# Patient Record
Sex: FEMALE | Race: WHITE | NOT HISPANIC OR LATINO | URBAN - METROPOLITAN AREA
[De-identification: names, ages, dates, MRNs, and addresses within clinical notes are randomized per-mention and may not be internally consistent; named-entity substitution may affect disease eponyms.]

---

## 2017-04-13 ENCOUNTER — OFFICE VISIT - RIVER FALLS (OUTPATIENT)
Dept: FAMILY MEDICINE | Facility: CLINIC | Age: 22
End: 2017-04-13

## 2017-04-13 ASSESSMENT — MIFFLIN-ST. JEOR: SCORE: 1908.68

## 2018-08-31 ENCOUNTER — OFFICE VISIT - RIVER FALLS (OUTPATIENT)
Dept: FAMILY MEDICINE | Facility: CLINIC | Age: 23
End: 2018-08-31

## 2018-09-28 ENCOUNTER — OFFICE VISIT - RIVER FALLS (OUTPATIENT)
Dept: FAMILY MEDICINE | Facility: CLINIC | Age: 23
End: 2018-09-28

## 2018-09-28 ASSESSMENT — MIFFLIN-ST. JEOR: SCORE: 1745.39

## 2018-12-04 ENCOUNTER — OFFICE VISIT - RIVER FALLS (OUTPATIENT)
Dept: FAMILY MEDICINE | Facility: CLINIC | Age: 23
End: 2018-12-04

## 2018-12-04 ASSESSMENT — MIFFLIN-ST. JEOR: SCORE: 1747.2

## 2018-12-05 ENCOUNTER — OFFICE VISIT - RIVER FALLS (OUTPATIENT)
Dept: FAMILY MEDICINE | Facility: CLINIC | Age: 23
End: 2018-12-05

## 2019-01-31 ENCOUNTER — OFFICE VISIT - RIVER FALLS (OUTPATIENT)
Dept: FAMILY MEDICINE | Facility: CLINIC | Age: 24
End: 2019-01-31

## 2019-01-31 ASSESSMENT — MIFFLIN-ST. JEOR: SCORE: 1742.66

## 2022-02-11 VITALS
WEIGHT: 243 LBS | HEIGHT: 69 IN | DIASTOLIC BLOOD PRESSURE: 70 MMHG | SYSTOLIC BLOOD PRESSURE: 118 MMHG | HEART RATE: 64 BPM | TEMPERATURE: 98.1 F | BODY MASS INDEX: 35.99 KG/M2

## 2022-02-12 VITALS
DIASTOLIC BLOOD PRESSURE: 72 MMHG | WEIGHT: 207 LBS | TEMPERATURE: 97.5 F | HEIGHT: 69 IN | WEIGHT: 204 LBS | HEART RATE: 70 BPM | HEART RATE: 70 BPM | DIASTOLIC BLOOD PRESSURE: 64 MMHG | SYSTOLIC BLOOD PRESSURE: 114 MMHG | BODY MASS INDEX: 30.57 KG/M2 | SYSTOLIC BLOOD PRESSURE: 118 MMHG | BODY MASS INDEX: 30.66 KG/M2 | TEMPERATURE: 98 F

## 2022-02-12 VITALS
HEART RATE: 56 BPM | DIASTOLIC BLOOD PRESSURE: 74 MMHG | HEART RATE: 48 BPM | WEIGHT: 206.4 LBS | SYSTOLIC BLOOD PRESSURE: 102 MMHG | DIASTOLIC BLOOD PRESSURE: 70 MMHG | HEART RATE: 52 BPM | TEMPERATURE: 97.1 F | WEIGHT: 207.4 LBS | TEMPERATURE: 98.6 F | DIASTOLIC BLOOD PRESSURE: 70 MMHG | SYSTOLIC BLOOD PRESSURE: 106 MMHG | HEIGHT: 69 IN | BODY MASS INDEX: 30.57 KG/M2 | SYSTOLIC BLOOD PRESSURE: 122 MMHG | OXYGEN SATURATION: 99 % | HEIGHT: 69 IN | WEIGHT: 204 LBS | BODY MASS INDEX: 30.72 KG/M2 | TEMPERATURE: 98.4 F | BODY MASS INDEX: 30.57 KG/M2

## 2022-02-16 NOTE — NURSING NOTE
Comprehensive Intake Entered On:  1/31/2019 6:31 PM CST    Performed On:  1/31/2019 6:27 PM CST by Yasmine Pierce               Summary   Chief Complaint :   Duloxetine med check/ refill   Menstrual Status :   Menarcheal   Weight Measured :   206.4 lb(Converted to: 206 lb 6 oz, 93.62 kg)    Height Measured :   68.5 in(Converted to: 5 ft 8 in, 173.99 cm)    Body Mass Index :   30.92 kg/m2 (HI)    Body Surface Area :   2.12 m2   Systolic Blood Pressure :   102 mmHg   Diastolic Blood Pressure :   74 mmHg   Mean Arterial Pressure :   83 mmHg   Peripheral Pulse Rate :   56 bpm (LOW)    BP Site :   Right arm   Pulse Site :   Radial artery   BP Method :   Manual   HR Method :   Manual   Temperature Tympanic :   98.6 DegF(Converted to: 37.0 DegC)    Yasmine Pierce - 1/31/2019 6:27 PM CST   Health Status   Allergies Verified? :   Yes   Medication History Verified? :   Yes   Medical History Verified? :   Yes   Pre-Visit Planning Status :   Completed   Tobacco Use? :   Never smoker   Yasmine Pierce - 1/31/2019 6:27 PM CST   Consents   Consent for Immunization Exchange :   Consent Granted   Consent for Immunizations to Providers :   Consent Granted   Yasmine Pierce - 1/31/2019 6:27 PM CST   Meds / Allergies   (As Of: 1/31/2019 6:31:34 PM CST)   Allergies (Active)   No Known Medication Allergies  Estimated Onset Date:   Unspecified ; Created By:   Delores West CMA; Reaction Status:   Active ; Category:   Drug ; Substance:   No Known Medication Allergies ; Type:   Allergy ; Updated By:   Delores West CMA; Reviewed Date:   1/31/2019 6:31 PM CST        Medication List   (As Of: 1/31/2019 6:31:34 PM CST)   Prescription/Discharge Order    DULoxetine  :   DULoxetine ; Status:   Prescribed ; Ordered As Mnemonic:   DULoxetine 20 mg oral delayed release capsule ; Simple Display Line:   2 cap(s), Oral, bid, for 14 day(s), 56 cap(s), 0 Refill(s) ; Ordering Provider:   Jeanie Sanches; Catalog Code:   DULoxetine ; Order  Dt/Tm:   1/16/2019 2:46:26 PM            Home Meds    levonorgestrel  :   levonorgestrel ; Status:   Documented ; Ordered As Mnemonic:   Mary 13.5 mg intrauterine device ; Simple Display Line:   13.5 mg, 1 EA, Intrauteral, once, 0 Refill(s) ; Catalog Code:   levonorgestrel ; Order Dt/Tm:   9/23/2018 4:00:53 PM
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
## 2022-02-16 NOTE — NURSING NOTE
Generalized Anxiety Disorder Screening Entered On:  2/4/2019 2:03 PM CST    Performed On:  1/31/2019 2:02 PM CST by Lottie Yepez               Generalized Anxiety Disorder Screening   CHAPIN[place]O Nervous, Anxious On Edge :   More than half the days   [place]APINCITO Control Worrying B :   More than half the days   [place]APINCITO Worrying Too Much :   More than half the days   CHAPINCITO Restless :   Not at all   [place]APINCITO Easily Annoyed/Irritable :   Several days   CHAPINCITO Afraid :   Several days   [place]INCITO Trouble Relaxing :   Several days   CHAPINCITO Total Screening Score :   9    [place]IN[place]O Difficulty with Work, Home, Others :   Somewhat difficult   Lottie Yepez - 2/4/2019 2:02 PM CST   denies/3-4 cups/cans per day

## 2022-02-16 NOTE — TELEPHONE ENCOUNTER
---------------------  From: Lottie Yepez   To: Jeanie Sanches;     Sent: 4/16/2019 10:01:14 AM CDT  Subject: Scheduling Management     Patient no showed appointment. Appointment was for a post hospital visit-[facility] Scheduled the appointment.I left Dania a message to call me and let me know how she is doing and/or schedule to see me

## 2022-02-16 NOTE — TELEPHONE ENCOUNTER
---------------------  From: Lottie Yepez   To: Baron MARTI, Jeanie;     Sent: 1/31/2019 3:49:18 PM CST  Subject: Scheduling Management     Patient now showed 240pm appointment. Appointment was for a medication follow up.I called her, she forgot her appt, I will see her at 6:20 today

## 2022-02-16 NOTE — NURSING NOTE
Depression Screening Entered On:  2/4/2019 2:02 PM CST    Performed On:  1/31/2019 2:02 PM CST by Lottie Yepez               Depression Screening   Feeling Down, Depressed, Hopeless :   More than half the days   Little Interest - Pleasure in Activities :   Several days   Initial Depression Screen Score :   3    Trouble Falling or Staying Asleep :   Nearly every day   Feeling Tired or Little Energy :   More than half the days   Poor Appetite or Overeating :   Several days   Feeling Bad About Yourself :   More than half the days   Trouble Concentrating :   More than half the days   Moving or Speaking Slowly :   Several days   Thoughts Better Off Dead or Hurting Self :   Not at all   Detailed Depression Screen Score :   11    Total Depression Screen Score :   14    CHAPINCITO Difficulty with Work, Home, Others :   Somewhat difficult   Lottie Yepez - 2/4/2019 2:02 PM CST

## 2022-02-16 NOTE — NURSING NOTE
CAGE Assessment Entered On:  2/4/2019 2:02 PM CST    Performed On:  1/31/2019 2:02 PM CST by Lottie Yepez               Assessment   Have you ever felt you should cut down on your drinking :   No   Have people annoyed you by criticizing your drinking :   No   Have you ever felt bad or guilty about your drinking :   No   Have you ever taken a drink first thing in the morning to steady your nerves or get rid of a hangover (Eye-opener) :   No   CAGE Score :   0    Lottie Yepez - 2/4/2019 2:02 PM CST
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
## 2022-02-16 NOTE — TELEPHONE ENCOUNTER
Entered by Keesha Carmona CMA on January 17, 2019 9:40:11 AM CST  ---------------------  From: Keesha Carmona CMA   To: [facility]    Sent: 1/17/2019 9:40:11 AM CST  Subject: Medication Management     ** Not Approved: Refill not appropriate, will need appt for further refills **  DULoxetine (DULOXETINE DR 20MG CAPSULES)  TAKE 2 CAPSULES BY MOUTH TWICE DAILY FOR 14 DAYS  Qty:  360 cap(s)        Days Supply:  14        Refills:  0          MICHEAL     Route To Pharmacy - [facility]   Note from Pharmacy:  **Patient requests 90 days supply**  Signed by Keesha Carmona CMA            ------------------------------------------  From: [facility]  To: Jeanie Sanches  Sent: January 16, 2019 2:52:08 PM CST  Subject: Medication Management  Due: January 17, 2019 2:52:08 PM CST    ** On Hold Pending Signature **  Drug: DULoxetine (DULoxetine 20 mg oral delayed release capsule)  2 CAP(S) ORAL BID,X14 DAY(S)  Quantity: 56 cap(s)     Days Supply: 0         Refills: 0  Substitutions Allowed  Notes from Pharmacy: **Patient requests 90 days supply**    Dispensed Drug: DULoxetine (DULoxetine 20 mg oral delayed release capsule)  TAKE 2 CAPSULES BY MOUTH TWICE DAILY FOR 14 DAYS  Quantity: 360 cap(s)    Days Supply: 14        Refills: 0  Substitutions Allowed  Notes from Pharmacy: **Patient requests 90 days supply**  ------------------------------------------
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
## 2022-02-16 NOTE — TELEPHONE ENCOUNTER
---------------------From: Navya Merino (Phone Messages Pool ([id number]_[employer])) To: Jeanie Sanches;   Sent: 1/15/2019 6:38:21 PM CSTSubject: Medication issue  Phone MessagePCP NCBTime of Call  5:48Person Calling PtPhone number [phone]  _ Pt left message stating that she called [facility] 1/14/2019 to get her medication refilled ( duloxetine 20 mg), but pharmacy told her that there was a hold on it. She is out of medication and would like us to call [facility].  Called to pharmacy, they state that her prescription was closed by the provider on 12/5/18.  They will need a new prescription sent over if pt is to be on it.  Please send in new rx or advise pt.Last office visit and reason _12/5/2018 Malaise---------------------From: Jeanie Sanches To: Phone [place] Pool ([id number]);   Sent: 1/16/2019 8:11:29 AM CSTSubject: RE: Medication issue I tried to contact her at the number below but there is no voice mail set up. I called the pharmacy and they have the same number . the last dose filled was 20 mg  2 tabs bid on 12/5/.   I would like to talk to her about how she is doing and likely reduce the dose so if she calls back either page me or have her see me today please.---------------------From: Keesha Carmona CMA (Phone Messages Pool ([id number])) To: NCB Message Pool ([id number]);   Sent: 1/16/2019 2:25:25 PM CSTSubject: RE: Medication issue Mom calls regarding medication. I called pt - she states she would like to keep on same medication & dose (20mg capsule taking 2 caps po bid) for right now. She is at home right now in [place], but will be back in the  area for school in 2 weeks, has an appt scheduled 1/29/19 to discuss. She states that the medication is working well. Pt doesn't want to be out and go through withdrawals, please advise refill. Requesting a refill to [facility].---------------------From: Yasmine Pierce ([facility] Message Pool  ([id number]_[facility])) To: Jeanie Sanches;   Sent: 1/16/2019 2:35:18 PM CSTSubject: FW: Medication issue---------------------From: Jeanie Sanches To: NC Message Pool ([id number]_[facility]);   Sent: 1/16/2019 2:48:48 PM CSTSubject: RE: Medication issue I sent a 2 week refill to [facility] and will look forward to seeing Dania Stewart pt by phone call. Verbalized understanding.

## 2022-02-16 NOTE — TELEPHONE ENCOUNTER
---------------------  From: Lottie Yepez   To: Baron MARTI, Jeanie;     Sent: 1/29/2019 12:56:33 PM CST  Subject: Scheduling Management     Patient no showed 12pm appointment. Schedule does not state what the appointment was for.
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
## 2022-02-16 NOTE — TELEPHONE ENCOUNTER
Entered by Yasmine Pierce on August 13, 2019 10:00:03 AM CDT  ---------------------  From: Yasmine Pierce   To: [facility][id number]    Sent: 8/13/2019 10:00:03 AM CDT  Subject: Medication Management     ** Submitted: **  Order:DULoxetine (DULoxetine 20 mg oral delayed release capsule)  2 cap(s)  Oral  bid  see provider for further refills.  Qty:  360 cap(s)        Days Supply:  90        Refills:  0          Substitutions Allowed     Route To [facility][id number]    Signed by Yasmine Pierce  8/13/2019 9:59:00 AM    ** Submitted: **  Complete:DULoxetine (DULoxetine 20 mg oral delayed release capsule)   Signed by Yasmine Pierce  8/13/2019 10:00:00 AM    ** Not Approved:  **  DULoxetine (DULOXETINE DR 20MG CAPSULES)  TAKE 2 CAPSULES BY MOUTH TWICE DAILY  Qty:  360 cap(s)        Days Supply:  90        Refills:  0          Substitutions Allowed     Route To [facility] #[id number]   Signed by Yasmine Pierce            ------------------------------------------  From: [facility] #[id number]  To: Jeanie Sanches  Sent: August 12, 2019 4:31:57 PM CDT  Subject: Medication Management  Due: August 13, 2019 4:31:57 PM CDT    ** On Hold Pending Signature **  Drug: DULoxetine (DULoxetine 20 mg oral delayed release capsule)  2 CAP(S) ORAL BID,X90 DAY(S)  Quantity: 360 unknown unit Days Supply: 0         Refills: 0  Substitutions Allowed  Notes from Pharmacy:     Dispensed Drug: DULoxetine (DULoxetine 20 mg oral delayed release capsule)  TAKE 2 CAPSULES BY MOUTH TWICE DAILY  Quantity: 360 cap(s)    Days Supply: 90        Refills: 0  Substitutions Allowed  Notes from Pharmacy:   ------------------------------------------
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
